# Patient Record
Sex: FEMALE | Race: WHITE | Employment: OTHER | URBAN - METROPOLITAN AREA
[De-identification: names, ages, dates, MRNs, and addresses within clinical notes are randomized per-mention and may not be internally consistent; named-entity substitution may affect disease eponyms.]

---

## 2018-04-03 ENCOUNTER — HOSPITAL ENCOUNTER (OUTPATIENT)
Dept: OTHER | Age: 68
Discharge: HOME | End: 2018-04-03
Attending: INTERNAL MEDICINE | Admitting: INTERNAL MEDICINE

## 2018-04-03 LAB
ANION GAP SERPL CALCULATED.3IONS-SCNC: 10 MMOL/L (ref 3–16)
BASOPHILS ABSOLUTE: 0.1 K/UL (ref 0–0.2)
BASOPHILS RELATIVE PERCENT: 3.5 %
BUN BLDV-MCNC: 24 MG/DL (ref 7–20)
CALCIUM SERPL-MCNC: 8.7 MG/DL (ref 8.3–10.6)
CHLORIDE BLD-SCNC: 106 MMOL/L (ref 99–110)
CHOLESTEROL, TOTAL: 171 MG/DL (ref 0–199)
CO2: 25 MMOL/L (ref 21–32)
CREAT SERPL-MCNC: 0.7 MG/DL (ref 0.6–1.2)
EOSINOPHILS ABSOLUTE: 0.2 K/UL (ref 0–0.6)
EOSINOPHILS RELATIVE PERCENT: 3.9 %
GFR AFRICAN AMERICAN: >60
GFR NON-AFRICAN AMERICAN: >60
GLUCOSE BLD-MCNC: 117 MG/DL (ref 70–99)
HCT VFR BLD CALC: 36 % (ref 36–48)
HDLC SERPL-MCNC: 68 MG/DL (ref 40–60)
HEMOGLOBIN: 12.2 G/DL (ref 12–16)
LDL CHOLESTEROL CALCULATED: 87 MG/DL
LYMPHOCYTES ABSOLUTE: 1.1 K/UL (ref 1–5.1)
LYMPHOCYTES RELATIVE PERCENT: 25.9 %
MCH RBC QN AUTO: 33.1 PG (ref 26–34)
MCHC RBC AUTO-ENTMCNC: 34 G/DL (ref 31–36)
MCV RBC AUTO: 97.2 FL (ref 80–100)
MONOCYTES ABSOLUTE: 0.4 K/UL (ref 0–1.3)
MONOCYTES RELATIVE PERCENT: 8.3 %
NEUTROPHILS ABSOLUTE: 2.5 K/UL (ref 1.7–7.7)
NEUTROPHILS RELATIVE PERCENT: 58.4 %
PDW BLD-RTO: 13.5 % (ref 12.4–15.4)
PLATELET # BLD: 294 K/UL (ref 135–450)
PMV BLD AUTO: 9.4 FL (ref 5–10.5)
POTASSIUM SERPL-SCNC: 4.8 MMOL/L (ref 3.5–5.1)
RBC # BLD: 3.7 M/UL (ref 4–5.2)
SODIUM BLD-SCNC: 141 MMOL/L (ref 136–145)
TRIGL SERPL-MCNC: 82 MG/DL (ref 0–150)
VLDLC SERPL CALC-MCNC: 16 MG/DL
WBC # BLD: 4.2 K/UL (ref 4–11)

## 2019-06-12 RX ORDER — MELOXICAM 7.5 MG/1
7.5 TABLET ORAL DAILY
COMMUNITY

## 2019-06-12 RX ORDER — ALENDRONATE SODIUM 70 MG/1
70 TABLET ORAL
COMMUNITY

## 2019-06-12 RX ORDER — ROSUVASTATIN CALCIUM 5 MG/1
5 TABLET, COATED ORAL DAILY
COMMUNITY

## 2019-06-12 NOTE — PROGRESS NOTES
Narcisa Siemens    Age 76 y.o.    female    1950    MRN [id number]    Date___________   Arrival Time_____________  OR Time____________Duration____     Procedure(s):  EXCISION SUBUNGUAL OSTEOCHONDROMA RIGHT GREAT TOE    Surgeon  ________________________________  [facility]   General   [place]       Phone [phone] (home) [phone] (work)      [address]  Cell Work  ______________________________________________________________________________________________________________________________________________________________________________________________________________________________________________________________________________________________________________________________________________________________    PCP__________________________Phone__________________________________      H&P__________________Bringing      Chart              Epic    DOS           Called_______  EKG__________________Bringing      Chart              Epic    DOS           Called_______  LAB__________________ Bringing      Chart              Epic    DOS           Called_______  CardiacClearance _______Bringing      Chart              Epic    DOS           Called_______      Cardiologist________________________ Phone___________________________      ? Zoroastrian concerns / Waiver on Chart            [employer]________________  ? Pre-op Instructions Given [place]          _________________________________  ? Directions to Surgery Center                          _________________________________  ? Transportation Home_______________      _________________________________  ?  Crutches/Walker__________________        _________________________________      ________Pre-op Orders   _______Transcribed    _______Wt.  ________Pharmacy          _______SCD  ______VTE     ______Beta Blocker  ________Consent

## 2019-06-12 NOTE — PROGRESS NOTES
Obstructive Sleep Apnea (GUSTAVO) Screening     Patient:  Wilfredo Soto    YOB: 1950      Medical Record #:  [id number]                     Date:  6/12/2019     1. Are you a loud and/or regular snorer? []  Yes       [x] No    2. Have you been observed to gasp or stop breathing during sleep? []  Yes       [x] No    3. Do you feel tired or groggy upon awakening or do you awaken with a headache?           []  Yes       [] No    4. Are you often tired or fatigued during the wake time hours? []  Yes       [] No    5. Do you fall asleep sitting, reading, watching TV or driving? []  Yes       [] No    6. Do you often have problems with memory or concentration? []  Yes       [] No    **If patient's score is ? 3 they are considered high risk for GUSTAVO. Notify the anesthesiologist of the high risk and document in focus note. Note:  If the patient's BMI is more than 35 kg m¯² , has neck circumference > 40 cm, and/or high blood pressure the risk is greater (© American Sleep Apnea Association, 2006).

## 2019-06-17 ENCOUNTER — ANESTHESIA EVENT (OUTPATIENT)
Dept: OPERATING ROOM | Age: 69
End: 2019-06-17
Payer: MEDICARE

## 2019-06-17 NOTE — ANESTHESIA PRE PROCEDURE
History:     Smoking status: Never Smoker    Smokeless tobacco: Never Used   Substance Use Topics    Alcohol use: Yes     Alcohol/week: 1.2 oz     Types: 2 Glasses of wine per week     Vital Signs (Current):    BP: 129/86 Pulse: 82   Resp: 20 SpO2: 100   Temp: 97.4 °F (36.3 °C)   Height: 5' 6\" (1.676 m)  (06/12/19) Weight: 172 lb (78 kg)  (06/12/19)   BMI: 27.8           BP Readings from Last 3 Encounters:   10/14/16 142/88   05/23/16 120/68   07/14/14 146/90     NPO Status: > 8 hrs    CBC:    HGB 12.2 04/03/2018    HCT 36.0 04/03/2018     04/03/2018     CMP:     04/03/2018    K 4.8 04/03/2018     04/03/2018    CO2 25 04/03/2018    BUN 24 04/03/2018    CREATININE 0.7 04/03/2018    GLUCOSE 117 04/03/2018    CALCIUM 8.7 04/03/2018     Anesthesia Evaluation  Patient summary reviewed and Nursing notes reviewed  Airway: Mallampati: II  TM distance: >3 FB   Neck ROM: full  Mouth opening: > = 3 FB Dental:          Pulmonary:       (-) COPD, asthma, sleep apnea and not a current smoker                           Cardiovascular:  Exercise tolerance: good (>4 METS),       (-) hypertension, past MI,  angina and  METCALF                Neuro/Psych:      (-) seizures, TIA and CVA           GI/Hepatic/Renal:   (+) hiatal hernia, morbid obesity     (-) GERD and no renal disease       Endo/Other:    (+) hypothyroidism::., .    (-) diabetes mellitus               Abdominal:           Vascular:     - DVT and PE. Anesthesia Plan      MAC and TIVA     ASA 2       Induction: intravenous. MIPS: Prophylactic antiemetics administered. Anesthetic plan and risks discussed with patient. Plan discussed with CRNA.             Karla Mccord MD

## 2019-06-18 ENCOUNTER — HOSPITAL ENCOUNTER (OUTPATIENT)
Age: 69
Setting detail: OUTPATIENT SURGERY
Discharge: HOME | End: 2019-06-18
Attending: PODIATRIST | Admitting: PODIATRIST
Payer: MEDICARE

## 2019-06-18 ENCOUNTER — ANESTHESIA (OUTPATIENT)
Dept: OPERATING ROOM | Age: 69
End: 2019-06-18
Payer: MEDICARE

## 2019-06-18 VITALS
HEART RATE: 82 BPM | HEIGHT: 66 IN | SYSTOLIC BLOOD PRESSURE: 129 MMHG | RESPIRATION RATE: 20 BRPM | WEIGHT: 172 LBS | DIASTOLIC BLOOD PRESSURE: 86 MMHG | BODY MASS INDEX: 27.64 KG/M2 | TEMPERATURE: 97.4 F | OXYGEN SATURATION: 100 %

## 2019-06-18 VITALS — TEMPERATURE: 96.6 F | SYSTOLIC BLOOD PRESSURE: 92 MMHG | OXYGEN SATURATION: 93 % | DIASTOLIC BLOOD PRESSURE: 60 MMHG

## 2019-06-18 DIAGNOSIS — D16.31 OSTEOCHONDROMA OF TOE OF RIGHT FOOT: ICD-10-CM

## 2019-06-18 PROCEDURE — 2580000003 HC RX 258: Performed by: ANESTHESIOLOGY

## 2019-06-18 PROCEDURE — 88311 DECALCIFY TISSUE: CPT

## 2019-06-18 PROCEDURE — 7100000011 HC PHASE II RECOVERY - ADDTL 15 MIN: Performed by: PODIATRIST

## 2019-06-18 PROCEDURE — 2500000003 HC RX 250 WO HCPCS: Performed by: PODIATRIST

## 2019-06-18 PROCEDURE — 6360000002 HC RX W HCPCS: Performed by: NURSE ANESTHETIST, CERTIFIED REGISTERED

## 2019-06-18 PROCEDURE — 3700000001 HC ADD 15 MINUTES (ANESTHESIA): Performed by: PODIATRIST

## 2019-06-18 PROCEDURE — 7100000000 HC PACU RECOVERY - FIRST 15 MIN: Performed by: PODIATRIST

## 2019-06-18 PROCEDURE — 6360000002 HC RX W HCPCS: Performed by: PODIATRIST

## 2019-06-18 PROCEDURE — 88304 TISSUE EXAM BY PATHOLOGIST: CPT

## 2019-06-18 PROCEDURE — 2709999900 HC NON-CHARGEABLE SUPPLY: Performed by: PODIATRIST

## 2019-06-18 PROCEDURE — 3700000000 HC ANESTHESIA ATTENDED CARE: Performed by: PODIATRIST

## 2019-06-18 PROCEDURE — 3600000014 HC SURGERY LEVEL 4 ADDTL 15MIN: Performed by: PODIATRIST

## 2019-06-18 PROCEDURE — 6370000000 HC RX 637 (ALT 250 FOR IP): Performed by: ANESTHESIOLOGY

## 2019-06-18 PROCEDURE — 3600000004 HC SURGERY LEVEL 4 BASE: Performed by: PODIATRIST

## 2019-06-18 PROCEDURE — 2580000003 HC RX 258: Performed by: PODIATRIST

## 2019-06-18 PROCEDURE — 7100000010 HC PHASE II RECOVERY - FIRST 15 MIN: Performed by: PODIATRIST

## 2019-06-18 RX ORDER — PROPOFOL 10 MG/ML
INJECTION, EMULSION INTRAVENOUS PRN
Status: DISCONTINUED | OUTPATIENT
Start: 2019-06-18 | End: 2019-06-18 | Stop reason: SDUPTHER

## 2019-06-18 RX ORDER — MIDAZOLAM HYDROCHLORIDE 1 MG/ML
INJECTION INTRAMUSCULAR; INTRAVENOUS PRN
Status: DISCONTINUED | OUTPATIENT
Start: 2019-06-18 | End: 2019-06-18 | Stop reason: SDUPTHER

## 2019-06-18 RX ORDER — CYCLOSPORINE 0.5 MG/ML
1 EMULSION OPHTHALMIC 2 TIMES DAILY
COMMUNITY

## 2019-06-18 RX ORDER — OXYCODONE HYDROCHLORIDE AND ACETAMINOPHEN 5; 325 MG/1; MG/1
1 TABLET ORAL ONCE
Status: COMPLETED | OUTPATIENT
Start: 2019-06-18 | End: 2019-06-18

## 2019-06-18 RX ORDER — FENTANYL CITRATE 50 UG/ML
INJECTION, SOLUTION INTRAMUSCULAR; INTRAVENOUS PRN
Status: DISCONTINUED | OUTPATIENT
Start: 2019-06-18 | End: 2019-06-18 | Stop reason: SDUPTHER

## 2019-06-18 RX ORDER — ONDANSETRON 2 MG/ML
INJECTION INTRAMUSCULAR; INTRAVENOUS PRN
Status: DISCONTINUED | OUTPATIENT
Start: 2019-06-18 | End: 2019-06-18 | Stop reason: SDUPTHER

## 2019-06-18 RX ORDER — SODIUM CHLORIDE 0.9 % (FLUSH) 0.9 %
10 SYRINGE (ML) INJECTION EVERY 12 HOURS SCHEDULED
Status: DISCONTINUED | OUTPATIENT
Start: 2019-06-18 | End: 2019-06-18 | Stop reason: HOSPADM

## 2019-06-18 RX ORDER — LIDOCAINE HYDROCHLORIDE 10 MG/ML
1 INJECTION, SOLUTION EPIDURAL; INFILTRATION; INTRACAUDAL; PERINEURAL
Status: DISCONTINUED | OUTPATIENT
Start: 2019-06-18 | End: 2019-06-18 | Stop reason: HOSPADM

## 2019-06-18 RX ORDER — OXYCODONE HYDROCHLORIDE AND ACETAMINOPHEN 5; 325 MG/1; MG/1
TABLET ORAL
Status: DISCONTINUED
Start: 2019-06-18 | End: 2019-06-18 | Stop reason: HOSPADM

## 2019-06-18 RX ORDER — SODIUM CHLORIDE 0.9 % (FLUSH) 0.9 %
10 SYRINGE (ML) INJECTION PRN
Status: DISCONTINUED | OUTPATIENT
Start: 2019-06-18 | End: 2019-06-18 | Stop reason: HOSPADM

## 2019-06-18 RX ORDER — SODIUM CHLORIDE, SODIUM LACTATE, POTASSIUM CHLORIDE, CALCIUM CHLORIDE 600; 310; 30; 20 MG/100ML; MG/100ML; MG/100ML; MG/100ML
INJECTION, SOLUTION INTRAVENOUS CONTINUOUS
Status: DISCONTINUED | OUTPATIENT
Start: 2019-06-18 | End: 2019-06-18 | Stop reason: HOSPADM

## 2019-06-18 RX ORDER — LEVOTHYROXINE SODIUM 0.1 MG/1
100 TABLET ORAL DAILY
COMMUNITY

## 2019-06-18 RX ORDER — LIDOCAINE HYDROCHLORIDE 10 MG/ML
INJECTION, SOLUTION EPIDURAL; INFILTRATION; INTRACAUDAL; PERINEURAL PRN
Status: DISCONTINUED | OUTPATIENT
Start: 2019-06-18 | End: 2019-06-18 | Stop reason: ALTCHOICE

## 2019-06-18 RX ORDER — MAGNESIUM HYDROXIDE 1200 MG/15ML
LIQUID ORAL CONTINUOUS PRN
Status: COMPLETED | OUTPATIENT
Start: 2019-06-18 | End: 2019-06-18

## 2019-06-18 RX ADMIN — PROPOFOL 20 MG: 10 INJECTION, EMULSION INTRAVENOUS at 07:34

## 2019-06-18 RX ADMIN — SODIUM CHLORIDE, POTASSIUM CHLORIDE, SODIUM LACTATE AND CALCIUM CHLORIDE: 600; 310; 30; 20 INJECTION, SOLUTION INTRAVENOUS at 06:40

## 2019-06-18 RX ADMIN — PROPOFOL 20 MG: 10 INJECTION, EMULSION INTRAVENOUS at 07:32

## 2019-06-18 RX ADMIN — PROPOFOL 80 MG: 10 INJECTION, EMULSION INTRAVENOUS at 07:23

## 2019-06-18 RX ADMIN — PROPOFOL 30 MG: 10 INJECTION, EMULSION INTRAVENOUS at 07:29

## 2019-06-18 RX ADMIN — MIDAZOLAM HYDROCHLORIDE 2 MG: 2 INJECTION, SOLUTION INTRAMUSCULAR; INTRAVENOUS at 07:15

## 2019-06-18 RX ADMIN — ONDANSETRON 4 MG: 2 INJECTION INTRAMUSCULAR; INTRAVENOUS at 07:28

## 2019-06-18 RX ADMIN — FENTANYL CITRATE 25 MCG: 50 INJECTION INTRAMUSCULAR; INTRAVENOUS at 07:19

## 2019-06-18 RX ADMIN — Medication 2 G: at 07:23

## 2019-06-18 RX ADMIN — OXYCODONE HYDROCHLORIDE AND ACETAMINOPHEN 1 TABLET: 5; 325 TABLET ORAL at 08:13

## 2019-06-18 RX ADMIN — PROPOFOL 40 MG: 10 INJECTION, EMULSION INTRAVENOUS at 07:26

## 2019-06-18 RX ADMIN — FENTANYL CITRATE 25 MCG: 50 INJECTION INTRAMUSCULAR; INTRAVENOUS at 07:34

## 2019-06-18 RX ADMIN — PROPOFOL 30 MG: 10 INJECTION, EMULSION INTRAVENOUS at 07:36

## 2019-06-18 RX ADMIN — SODIUM CHLORIDE, POTASSIUM CHLORIDE, SODIUM LACTATE AND CALCIUM CHLORIDE: 600; 310; 30; 20 INJECTION, SOLUTION INTRAVENOUS at 07:15

## 2019-06-18 ASSESSMENT — PULMONARY FUNCTION TESTS
PIF_VALUE: 0
PIF_VALUE: 1
PIF_VALUE: 0
PIF_VALUE: 1
PIF_VALUE: 0
PIF_VALUE: 1
PIF_VALUE: 0

## 2019-06-18 ASSESSMENT — PAIN SCALES - GENERAL
PAINLEVEL_OUTOF10: 3
PAINLEVEL_OUTOF10: 0
PAINLEVEL_OUTOF10: 4

## 2019-06-18 ASSESSMENT — PAIN DESCRIPTION - LOCATION: LOCATION: FOOT

## 2019-06-18 ASSESSMENT — PAIN DESCRIPTION - ORIENTATION: ORIENTATION: RIGHT

## 2019-06-18 ASSESSMENT — PAIN - FUNCTIONAL ASSESSMENT: PAIN_FUNCTIONAL_ASSESSMENT: 0-10

## 2019-06-18 ASSESSMENT — PAIN DESCRIPTION - PAIN TYPE: TYPE: SURGICAL PAIN

## 2019-06-18 ASSESSMENT — LIFESTYLE VARIABLES: SMOKING_STATUS: 0

## 2019-06-18 NOTE — ANESTHESIA POSTPROCEDURE EVALUATION
Department of Anesthesiology  Postprocedure Note    Patient: Beata Charles  MRN: [id number]  YOB: 1950  Date of evaluation: 6/18/2019    Procedure Summary     Date:  06/18/19 Room / Location:  [facility] / [facility]    Anesthesia Start:  0715 Anesthesia Stop:  5161    Procedure:  EXCISION SUBUNGUAL OSTEOCHONDROMA RIGHT GREAT TOE (Right Foot) Diagnosis:       Osteochondroma of toe of right foot      (SUBUNGUAL OSTEOCHONDROMA RIGHT GREAT TOE)    Surgeon: Stephan Jones DPM Responsible Provider:  Mary Conde MD    Anesthesia Type:  MAC, TIVA ASA Status:  2     Anesthesia Type: No value filed. Manuel Phase I: Manuel Score: 10    Manuel Phase II: Manuel Score: 10    Last vitals: Reviewed and per EMR flowsheets.      Anesthesia Post Evaluation   Anesthetic Problems: no   Cardiovascular System Stable: yes  Respiratory Function: Airway Patent yes  ETT no  Ventilator no  Level of consciousness: awake, alert and oriented  Post-op pain: adequate analgesia  Hydration Adequate: yes  Nausea/Vomiting:no  Other Issues:     Ulysses Landaverde MD

## 2019-06-18 NOTE — H&P
H&P Update    Patient's History and Physical from June was reviewed. Patient examined. There has been no change.     Elizabeth Gasca

## 2019-06-18 NOTE — OP NOTE
[facility]                                OPERATIVE REPORT    PATIENT NAME: Yaneli Tran                  :        1950  MED REC NO:   [id number]                          ROOM:  ACCOUNT NO:   [de-identified]                           ADMIT DATE: 2019  PROVIDER:     Nati Barr DPM    DATE OF PROCEDURE:  2019    SURGEON:  Nati Barr DPM    ASSISTANT:  None. PREOPERATIVE DIAGNOSIS:  Osteochondroma, right great toe. POSTOPERATIVE DIAGNOSES:  1.  Osteochondroma, right great toe. 2.  Pending pathology. PATHOLOGY:  Osteochondroma, right distal phalanx of the great toe. HEMOSTASIS:  Pneumatic ankle tourniquet inflated to 250 mmHg per  anesthesia record. ESTIMATED BLOOD LOSS:  Minimal.    MATERIALS:  4-0 Vicryl and 4-0 nylon. INJECTABLES:  None. CONDITION:  Stable. DESCRIPTION OF PROCEDURE IN DETAIL:  The patient was brought to the  operating room and placed in the supine position on the operating table. Upon adequate sedation per anesthesia department, local infiltration of  10 mL of a 50:50 mixture of 1% lidocaine plain and 0.5% Marcaine plain  was performed to the right foot in a [place] block pattern. At this time,  a well-padded pneumatic ankle tourniquet was applied and the right foot,  prepped and draped in the usual sterile fashion. Following the  exsanguination with an Esmarch bandage, a tourniquet was raised at 250  mmHg. Attention was directed to the right great toe, where highly  incurvated nail plate was appreciated. Utilizing a [place] elevator, the  nail plate was atraumatically released from the underlying skin. It  should be noted that an additional 5 mL of 1% lidocaine plain was  utilized to achieve adequate anesthesia.   At this time, the area was  inspected and a distal transverse incision from medial to lateral at the  distal aspect of the right great toe was

## 2019-06-18 NOTE — PROGRESS NOTES
Advancing hob without compaints  Tolerating Drinking and eating : Attempted to implement non pharmacological methods  of pain management-repositioned/ ice in place   No ponv  Pt alert and appropriate  Respirations  Easy/ unlabored/ no Chest pain or SOB   Surgical drsg unchanged from initial assessment  Circulation checks on operative limb unchanged from last entry in pacu

## 2019-06-18 NOTE — BRIEF OP NOTE
Brief Postoperative Note  ______________________________________________________________    Patient: Radhames Garces  YOB: 1950  MRN: [id number]  Date of Procedure: 6/18/2019    Pre-Op Diagnosis: SUBUNGUAL OSTEOCHONDROMA RIGHT GREAT TOE    Post-Op Diagnosis: Same       Procedure(s):  EXCISION SUBUNGUAL OSTEOCHONDROMA RIGHT GREAT TOE    Anesthesia: Anesthesia type not filed in the log.     Surgeon(s):  Angie Armijo DPM    Assistant: none    Estimated Blood Loss (mL): less than 50     Complications: none    Specimens:   ID Type Source Tests Collected by Time Destination   A : OSTEOCHONDROMA RIGHT GREAT TOE Tissue Tissue SURGICAL PATHOLOGY Angie Armijo DPM 6/18/2019 0738        Implants:  * No implants in log *      Drains: * No LDAs found *    Findings: cartilage cap mass dorsal distal phalanx right great toe    Angie Armijo DPM  Date: 6/18/2019  Time: 7:54 AM

## 2019-06-18 NOTE — PROGRESS NOTES
Advanced pt from lying to sitting without adverse event/pt denies complaints of dizziness/ponv/ RESP  WNL/ surgical drsg/circulation checks on operative limb unchanged from my  last pacu assessment entry     Pt states pain is at a tolerable level-3     instructed pt if no void in 8 hours contact physician or go to ER    Discharge instructions reviewed with patient/responsible adult and understanding verbalized. Discharge instructions signed and copies given. Rx  Percocet                   Given to pts responsible adult b surgeon /instructed not to drive/ingest alcohol while taking narcotic medications. Instructed pt/family member Last dose of narcotics given in recovery room was          percocet    @    0813         Am . Medication information  sheet given to pt/family-all questions answered     Please follow narcotic administration as prescribed by your surgeon- any questions call your surgeon. If you have any problems contact your surgeon and  Go to the emergency room.     Operative drsg unchanged from my initial pacu assessment

## 2022-08-15 ENCOUNTER — HOSPITAL ENCOUNTER (OUTPATIENT)
Dept: PHYSICAL THERAPY | Age: 72
Setting detail: THERAPIES SERIES
Discharge: HOME | End: 2022-08-15
Payer: MEDICARE

## 2022-08-15 PROCEDURE — 97110 THERAPEUTIC EXERCISES: CPT | Performed by: PHYSICAL THERAPIST

## 2022-08-15 PROCEDURE — 97161 PT EVAL LOW COMPLEX 20 MIN: CPT | Performed by: PHYSICAL THERAPIST

## 2022-08-15 PROCEDURE — 97112 NEUROMUSCULAR REEDUCATION: CPT | Performed by: PHYSICAL THERAPIST

## 2022-08-15 NOTE — FLOWSHEET NOTE
[facility], [address]  Phone: [phone]  Fax [phone]    Physical Therapy Treatment Note/ Progress Report:           Date:  8/15/2022    Patient Name:  Latasha Case    :  1950  MRN: [id number]  Restrictions/Precautions:    Medical/Treatment Diagnosis Information:  Diagnosis: M54.50 (ICD-10-CM) - Lumbar pain, lumbar spondylolisthesis M43.16  Treatment Diagnosis: M54.50 (ICD-10-CM) - Lumbar pain, lumbar spondylolisthesis M43.16 s/p lumbar fusion 6/3/22 L4-5. Insurance/Certification information:  PT Insurance Information: [employer] /BMN/Needs [employer] auth  Physician Information:   Peter Thayer MD  Has the plan of care been signed (Y/N):        []  Yes  [x]  No     Date of Patient follow up with Physician: 22      Is this a Progress Report:     []  Yes  [x]  No        If Yes:  Date Range for reporting period:  Beginning8/15/22  Ending:     Progress report will be due (10 Rx or 30 days whichever is less):        Recertification will be due (POC Duration  / 90 days whichever is less): 12 weeks from IE on 8/15/22        Visit # 28036 Leslye Escobar Required   In-person 1 ? [x]  Yes COHERE []  No    Telehealth   []  Yes []  No    Total            Functional Scale: FOTO: 58/100 Risk: 53/100    Date assessed:  8/15/22      Therapy Diagnosis/Practice Pattern:4I. Impaired joint mobility, motor function, muscle performance,  and ROM associated with  bony or soft tissue surgical procedures. Number of Comorbidities:  []0     [x]1-2    []3+    Latex Allergy:  [x]NO      []YES  Preferred Language for Healthcare:   [x]English       []other:      Pain level:  1/10    SUBJECTIVE:  See eval    OBJECTIVE: See eval. Provided education regarding positioning/unloading program and centralization techniques.    OBJECTIVE  Test used Initial score Current Score   Pain Summary      Functional questionnaire      Functional Testing            ROM            Strength                Observation:   Test measurements:      RESTRICTIONS/PRECAUTIONS: No lifting > 10 visits    Exercises/Interventions: 10 weeks post-op on 8/12/22 ( 6/3/22)    Therapeutic Ex (18313) Sets/sec Reps Notes/CUES   See below for HEP instruction. Reviewed and performed all exercises  10'                                                                      Manual Intervention (56385)      Manual stretches to H/GERALDO quads and piriformis 5'                                   NMR re-education (62085)   CUES NEEDED         Patient education regarding posture /body mechanics/HEP 10'                                               Therapeutic Activity (62817)                                          HEP instruction: (see scanned forms)  Access Code: [email or web address]  URL: [email or web address]  Date: 08/15/2022  Prepared by: Cleotis Eliot     Exercises  Cat-Camel to Child's Pose - 1-2 x daily - 7 x weekly - 1-2 sets - 10 reps - 10 hold  Supine Lower Trunk Rotation - 1-2 x daily - 7 x weekly - 1-2 sets - 10 reps - 10 hold  Supine Hip Adduction Isometric with Ball - 1-2 x daily - 7 x weekly - 2 sets - 10 reps - 10 hold  Hooklying Clamshell with Resistance - 1-2 x daily - 7 x weekly - 2 sets - 10 reps - 10 hold  Supine Transversus Abdominis Bracing - Hands on Stomach - 3 x daily - 7 x weekly - 2 sets - 10 reps - 10 hold  Bridge with Hip Abduction and Resistance - Ground Touches - 1-2 x daily - 7 x weekly - 2 sets - 10 reps - 5-10 hold       Therapeutic Exercise and NMR EXR  [x] (53719) Provided verbal/tactile cueing for activities related to strengthening, flexibility, endurance, ROM  for improvements in proximal hip and core control with self care, mobility, lifting and ambulation.  [] (61547) Provided verbal/tactile cueing for activities related to improving balance, coordination, kinesthetic sense, posture, motor skill, proprioception  to assist with core control in self care, mobility, lifting, and ambulation. Therapeutic Activities:    [] (86921 or 50712) Provided verbal/tactile cueing for activities related to improving balance, coordination, kinesthetic sense, posture, motor skill, proprioception and motor activation to allow for proper function  with self care and ADLs  [] (75404) Provided training and instruction to the patient for proper core and proximal hip recruitment and positioning with ambulation re-education     Home Exercise Program:    [x] (71816) Reviewed/Progressed HEP activities related to strengthening, flexibility, endurance, ROM of core, proximal hip and LE for functional self-care, mobility, lifting and ambulation   [] (77724) Reviewed/Progressed HEP activities related to improving balance, coordination, kinesthetic sense, posture, motor skill, proprioception of core, proximal hip and LE for self care, mobility, lifting, and ambulation      Manual Treatments:  PROM / STM / Oscillations-Mobs:  G-I, II, III, IV (PA's, Inf., Post.)  [x] (14366) Provided manual therapy to mobilize proximal hip and LS spine soft tissue/joints for the purpose of modulating pain, promoting relaxation,  increasing ROM, reducing/eliminating soft tissue swelling/inflammation/restriction, improving soft tissue extensibility and allowing for proper ROM for normal function with self care, mobility, lifting and ambulation.      Modalities:   Deferred today    Charges  Timed Code Treatment Minutes: 30'   Total Treatment Minutes: 48'     Medicare total (add KX at $2000)             [x] EVAL (LOW) 26800   [] EVAL (MOD) 34405   [] EVAL (HIGH) 59699   [] RE-EVAL     [x] SD(62288) x 1    [] IONTO  [x] NMR (21052) x 1    [] VASO  [] Manual (36520) x      [] Other:  [] TA x      [] Mech Traction (31084)  [] ES(attended) (55308)      [] ES (un) (83284):     Goals:   Patient stated goal: walking miles  [] Progressing: [] Met: [] Not Met: [] Adjusted     Therapist goals for Patient:  Short Term Goals: To be achieved in: 2 weeks  1. Independent in HEP and progression per patient tolerance, in order to prevent re-injury. [] Progressing: [] Met: [] Not Met: [] Adjusted  2. Patient will have a decrease in pain to facilitate improvement in movement, function, and ADLs as indicated by Functional Deficits. [] Progressing: [] Met: [] Not Met: [] Adjusted        Long Term Goals: To be achieved in: 12 weeks  1. Disability index score of 65% or more per FOTO to assist with reaching prior level of function. [] Progressing: [] Met: [] Not Met: [] Adjusted  2. Patient will demonstrate increased AROM to WNL, good LS mobility, good hip ROM to allow for proper joint functioning as indicated by patients Functional Deficits. [] Progressing: [] Met: [] Not Met: [] Adjusted  3. Patient will demonstrate an increase in Strength to good proximal hip and core activation to allow for proper functional mobility as indicated by patients Functional Deficits. [] Progressing: [] Met: [] Not Met: [] Adjusted  4. Patient will return to performing household activities functional activities without increased symptoms or restriction. [] Progressing: [] Met: [] Not Met: [] Adjusted  5. Patient to be able to walk 2-3 miles without difficulty or pain/stiffness.(patient specific functional goal)    [] Progressing: [] Met: [] Not Met: [] Adjusted       Progression Towards Functional goals:  [] Patient is progressing as expected towards functional goals listed. [] Progression is slowed due to complexities listed. [] Progression has been slowed due to co-morbidities. [x] Plan just implemented, too soon to assess goals progression  [] Other:     Overall Progression Towards Functional goals/ Treatment Progress Update:  [] Patient is progressing as expected towards functional goals listed. [] Progression is slowed due to complexities/Impairments listed. [] Progression has been slowed due to co-morbidities.   [x] Plan just implemented, too soon to assess goals progression <30days   [] Goals require adjustment due to lack of progress  [] Patient is not progressing as expected and requires additional follow up with physician  [] Other    Prognosis for POC: [x] Good [] Fair  [] Poor      Patient requires continued skilled intervention: [x] Yes  [] No    Treatment/Activity Tolerance:  [x] Patient able to complete treatment  [] Patient limited by fatigue  [] Patient limited by pain    [] Patient limited by other medical complications  [] Other:     ASSESSMENT: Patient s/p lumbar fusion on 6/3/22 L4-5. Presents with limited ROM/strength as expected following post-op surgical procedures. Pt requires skilled intervention to restore ROM, strength, functional endurance and balance in order to perform ADLs without significant symptoms or limitations. PReturn to Play: (if applicable)   []  Stage 1: Intro to Strength   []  Stage 2: Return to Run and Strength   []  Stage 3: Return to Jump and Strength   []  Stage 4: Dynamic Strength and Agility   []  Stage 5: Sport Specific Training     []  Ready to Return to Play, Meets All Above Stages   []  Not Ready for Return to Sports   Comments:                               PLAN: See eval. Progress lumbar stabilization and core strengthening as tolerated. [] Continue per plan of care [] Alter current plan (see comments above)  [x] Plan of care initiated [] Hold pending MD visit [] Discharge      Electronically signed by:  Harmeet Vo, [address] [place], [place] [address]T-    Note: If patient does not return for scheduled/ recommended follow up visits, this note will serve as a discharge from care along with most recent update on progress.

## 2022-08-15 NOTE — PLAN OF CARE
[facility], [address]  Phone: [phone]  Fax [phone]    Physical Therapy Certification    Dear Ze Warren MD ,    We had the pleasure of evaluating the following patient for physical therapy services at [address] [place] [address]. A summary of our findings can be found in the initial assessment below. This includes our plan of care. If you have any questions or concerns regarding these findings, please do not hesitate to contact me at the office phone number checked above. Thank you for the referral.       Physician Signature:_______________________________Date:__________________  By signing above (or electronic signature), therapists plan is approved by physician    Patient: Bing Finney   : 1950   MRN: [id number]  Referring Physician:  Ze Warren MD      Evaluation Date: 8/15/2022      Medical Diagnosis Information:  Diagnosis: M54.50 (ICD-10-CM) - Lumbar pain, lumbar spondylolisthesis M43.16   Treatment Diagnosis: M54.50 (ICD-10-CM) - Lumbar pain, lumbar spondylolisthesis M43.16  s/p lumbar fusion L4-5 on 6/3/22. Insurance information: PT Insurance Information: [employer] MC/BMN/Needs Cohere auth       Precautions/ Contra-indications: No lifting > 10 lbs. C-SSRS Triggered by Intake questionnaire (Past 2 wk assessment):   [x] No, Questionnaire did not trigger screening.   [] Yes, Patient intake triggered further evaluation      [] C-SSRS Screening completed  [] PCP notified via Plan of Care  [] Emergency services notified     Latex Allergy:  [x]NO      []YES  Preferred Language for Healthcare:   [x]English       []other:    SUBJECTIVE: Patient stated complaint: Patient reports history of low back since 2021 due to lifting activities. Flare up in January and got an DORIS for disc herniation.  Patient s/p lumbar fusion  L4-5 6/3/22 due to spondylolisthesis. Wore a brace for 1 month. Currently walking 1 mile on treadmill every other day. Main issue is feeling tight and weak. Was able to vacuum the other day without pain during or after. Relevant Medical History:CRP, blood disorder./anemic. Osteopenia. Hyperlipidemia, hypothyroid  Functional Disability Index/G-Codes: FOTO: 58/100  Risk adjusted: 53/100    Height: 5' 6\" Weight: 172  Pain Scale: 0-1/10  Easing factors: walking  Provocative factors:  activity, prolonged sitting. Type: []Constant   [x]Intermittent  []Radiating []Localized []other:     Numbness/Tingling: Denies N/T    Occupation/School: retired RN    Living Status/Prior Level of Function: Independent with ADLs and IADLs, Lives with  . Goes to [place] Oct- May. Yoga for exercises. [employer]le[employer]. OBJECTIVE:     ROM     LUMBAR FLEX 65 No pain hands to above ankles   LUMBAR EXT 10     Sidebend [facility] WFL   Rotation      LEFT RIGHT   HIP Flex [facility]   HIP Abd     HIP ER [facility]   HIP IR     Knee Flex [facility] WFL   Knee ext     Hamstring FLEX WFL WFL   Piriformis  WFL WFL             Strength  LEFT RIGHT   MfA     TrA 3  Initiates but unable to hold   HIP Flexors 4+ 4   HIP Abductors 4 4-   HIP ER 4- 4-   Hip IR 4- 4-   Knee EXT (quad) 4+ 4+   Knee Flex (HS) 4+ 4+   Ankle DF 4 4   Ankle PF 4 4   Great Toe Ext 4 4     Reflexes/Sensation:    [x]Dermatomes/Myotomes intact    []UE Reflexes     []Normal []Hypo      []Hyper   []LE Reflexes     [x]Normal []Hypo      []Hyper   []Babinski/Clonus/Hoffmans:    []Other:    Joint mobility: NT due to post-op   []Normal    []Hypo   []Hyper    Palpation: - tenderness with palpation    Functional Mobility/Transfers: IND    Posture: WFL, sym iliac crest    Bandages/Dressings/Incisions: Intact and healing well    Gait: (include devices/WB status) WFL. Good stride length with slight arm swing noted    Orthopedic Special Tests:  slight weakness/control noted with SLS R/L  but able to perform. [x] Patient history, allergies, meds reviewed. Medical chart reviewed. See intake form. Review Of Systems (ROS):  [x]Performed Review of systems (Integumentary, CardioPulmonary, Neurological) by intake and observation. Intake form has been scanned into medical record. Patient has been instructed to contact their primary care physician regarding ROS issues if not already being addressed at this time. Co-morbidities/Complexities (which will affect course of rehabilitation):   []None           Arthritic conditions   []Rheumatoid arthritis (M05.9)  []Osteoarthritis (M19.91)   Cardiovascular conditions   [x]Hypertension (I10)  [x]Hyperlipidemia (E78.5)  []Angina pectoris (I20)  []Atherosclerosis (I70)   Musculoskeletal conditions   []Disc pathology   []Congenital spine pathologies   []Prior surgical intervention  []Osteoporosis (M81.8)  [x]Osteopenia (M85.8)   Endocrine conditions   [x]Hypothyroid (E03.9)  []Hyperthyroid Gastrointestinal conditions   []Constipation (Q27.62)   Metabolic conditions   []Morbid obesity (E66.01)  []Diabetes type 1(E10.65) or 2 (E11.65)   []Neuropathy (G60.9)     Pulmonary conditions   []Asthma (J45)  []Coughing   []COPD (J44.9)   Psychological Disorders  []Anxiety (F41.9)  []Depression (F32.9)   []Other:   []Other:          Barriers to/and or personal factors that will affect rehab potential:              []Age  []Sex              []Motivation/Lack of Motivation                        [x]Co-Morbidities              []Cognitive Function, education/learning barriers              []Environmental, home barriers              []profession/work barriers  []past PT/medical experience  []other:  Justification: leaves for [place] in 1 in October. Falls Risk Assessment (30 days):   [x] Falls Risk assessed and no intervention required.   [] Falls Risk assessed and Patient requires intervention due to being higher risk   TUG score (>12s at risk):     [] Falls education provided, including           ASSESSMENT:   Functional Impairments:     []Noted lumbar/proximal hip hypomobility   []Noted lumbosacral and/or generalized hypermobility   [x]Decreased Lumbosacral/hip/LE functional ROM   [x]Decreased core/proximal hip strength and neuromuscular control    [x]Decreased LE functional strength    []Abnormal reflexes/sensation/myotomal/dermatomal deficits  []Reduced balance/proprioceptive control    []other:      Functional Activity Limitations (from functional questionnaire and intake)   [x]Reduced ability to tolerate prolonged functional positions   []Reduced ability or difficulty with changes of positions or transfers between positions   [x]Reduced ability to maintain good posture and demonstrate good body mechanics with sitting, bending, and lifting   []Reduced ability to sleep   [] Reduced ability or tolerance with driving and/or computer work   []Reduced ability to perform lifting, reaching, carrying tasks   []Reduced ability to squat   []Reduced ability to forward bend   [x]Reduced ability to ambulate prolonged functional periods/distances/surfaces   []Reduced ability to ascend/descend stairs   []other:       Participation Restrictions   []Reduced participation in self care activities   [x]Reduced participation in home management activities   []Reduced participation in work activities   [x]Reduced participation in social activities. []Reduced participation in sport/recreation activities. Classification:   [x]Signs/symptoms consistent with Lumbar instability/stabilization subgroup. []Signs/symptoms consistent with Lumbar mobilization/manipulation subgroup, myotomes and dermatomes intact. Meets manipulation criteria.     []Signs/symptoms consistent with Lumbar direction specific/centralization subgroup   []Signs/symptoms consistent with Lumbar traction subgroup     []Signs/symptoms consistent with lumbar facet dysfunction   []Signs/symptoms consistent with lumbar stenosis type dysfunction   []Signs/symptoms consistent with nerve root involvement including myotome & dermatome dysfunction   [x]Signs/symptoms consistent with post-surgical status including: decreased ROM, strength and function. []signs/symptoms consistent with pathology which may benefit from Dry needling     []other:      Prognosis/Rehab Potential:      []Excellent   []Good    []Fair   []Poor    Tolerance of evaluation/treatment:    []Excellent   [x]Good    []Fair   []Poor  Physical Therapy Evaluation Complexity Justification  [x] A history of present problem with:  [] no personal factors and/or comorbidities that impact the plan of care;  [x]1-2 personal factors and/or comorbidities that impact the plan of care  []3 personal factors and/or comorbidities that impact the plan of care  [x] An examination of body systems using standardized tests and measures addressing any of the following: body structures and functions (impairments), activity limitations, and/or participation restrictions;:  [] a total of 1-2 or more elements   [x] a total of 3 or more elements   [] a total of 4 or more elements   [x] A clinical presentation with:  [x] stable and/or uncomplicated characteristics   [] evolving clinical presentation with changing characteristics  [] unstable and unpredictable characteristics;   [x] Clinical decision making of [x] low, [] moderate, [] high complexity using standardized patient assessment instrument and/or measurable assessment of functional outcome.     [x] EVAL (LOW) 55982 (typically 20 minutes face-to-face)  [] EVAL (MOD) 22294 (typically 30 minutes face-to-face)  [] EVAL (HIGH) 39635 (typically 45 minutes face-to-face)  [] RE-EVAL         PLAN: Begin PT focusing on: proximal hip mobilizations, LB mobs, LB core activation, proximal hip activation, and HEP    Frequency/Duration:  1-2 days per week for 12 Weeks:  Interventions:  [x]  Therapeutic exercise including: strength training, ROM, for LE, Glutes and mobility, good hip ROM to allow for proper joint functioning as indicated by patients Functional Deficits. [] Progressing: [] Met: [] Not Met: [] Adjusted  3. Patient will demonstrate an increase in Strength to good proximal hip and core activation to allow for proper functional mobility as indicated by patients Functional Deficits. [] Progressing: [] Met: [] Not Met: [] Adjusted  4. Patient will return to performing household activities functional activities without increased symptoms or restriction. [] Progressing: [] Met: [] Not Met: [] Adjusted  5.  Patient to be able to walk 2-3 miles without difficulty or pain/stiffness.(patient specific functional goal)    [] Progressing: [] Met: [] Not Met: [] Adjusted       Electronically signed by:  Binu Neri, [address] OMT-C

## 2022-08-24 ENCOUNTER — HOSPITAL ENCOUNTER (OUTPATIENT)
Dept: PHYSICAL THERAPY | Age: 72
Setting detail: THERAPIES SERIES
Discharge: HOME | End: 2022-08-24
Payer: MEDICARE

## 2022-08-24 NOTE — FLOWSHEET NOTE
[facility], [address] [place]  Phone: [phone]  Fax [phone]    Physical Therapy Treatment Note/ Progress Report:           Date:  2022    Patient Name:  Abi Bansal    :  1950  MRN: [id number]  Restrictions/Precautions:    Medical/Treatment Diagnosis Information:  Diagnosis: M54.50 (ICD-10-CM) - Lumbar pain, lumbar spondylolisthesis M43.16  Treatment Diagnosis: M54.50 (ICD-10-CM) - Lumbar pain, lumbar spondylolisthesis M43.16 s/p lumbar fusion 6/3/22 L4-5. Insurance/Certification information:  PT Insurance Information: Esther /BMJOSE ALEJANDRO/Needs Cohere auth  Physician Information:   Katya Rivera MD  Has the plan of care been signed (Y/N):        []  Yes  [x]  No     Date of Patient follow up with Physician: 22      Is this a Progress Report:     []  Yes  [x]  No        If Yes:  Date Range for reporting period:  Beginnin/15/22  Ending:     Progress report will be due (10 Rx or 30 days whichever is less):        Recertification will be due (POC Duration  / 90 days whichever is less): 12 weeks from IE on 8/15/22        Visit # Insurance Allowable Auth Required   In-person 2 24 visits 8/15 /22 to 22 [x]  Yes COHERE []  No    Telehealth   []  Yes []  No    Total            Functional Scale: FOTO: 58/100 Risk: 53/100    Date assessed:  8/15/22      Therapy Diagnosis/Practice Pattern:4I. Impaired joint mobility, motor function, muscle performance,  and ROM associated with  bony or soft tissue surgical procedures. Number of Comorbidities:  []0     [x]1-2    []3+    Latex Allergy:  [x]NO      []YES  Preferred Language for Healthcare:   [x]English       []other:      Pain level:  1/10    SUBJECTIVE: Reports overdoing it with the LTR exercise. Reports rocking knee to floor instead of small range and had increased pain in low back for several days.  Still walking every other day for about 30 min on treadmill at 2.5 mph without any issues. OBJECTIVE: See eval. Provided education regarding positioning/unloading program and centralization techniques. OBJECTIVE  Test used 8/15/22 Current Score   Pain Summary 0-10 0-1    Functional questionnaire FOTO 58/100    Functional Testing       Lumbar Flex 65    ROM Lumbar Ext 10     TrA 3/5    Strength Hip Er/Ir 4-     Hip abductors L/R 4, 4-        Observation:   Test measurements:      RESTRICTIONS/PRECAUTIONS: No lifting > 10 visits    Exercises/Interventions: 10 weeks post-op on 8/12/22 ( 6/3/22)    Therapeutic Ex (18409) Sets/sec Reps Notes/CUES   See below for HEP instruction. Reviewed and performed all exercises  10'          Incline stretches H30 5 reps    True stretches HS H5 10 reps                Cat-camel to child pose H5 5 reps                                  Manual Intervention (99439)      Manual stretches to H/GERALDO quads and piriformis           SB piriformis stretches H20 3 reps                      NMR re-education (35331)   CUES NEEDED         Patient education regarding posture /body mechanics/HEP 10'           TrA  with hip add with stabilizer at 40mmhg H10 10 reps          TrA with BKFO H5 2x10 reps          Quad leg ext H5 1X10 reps    Reformer pilates 1R, 1B B LP/ walking in place  X20 reps V.C. for TrA activation   Therapeutic Activity (19261)                                          HEP instruction: (see scanned forms)  Access Code: [email or web address]  URL: [email or web address]  Date: 08/15/2022  Prepared by: Murray Sharp     Exercises  Cat-Camel to Child's Pose - 1-2 x daily - 7 x weekly - 1-2 sets - 10 reps - 10 hold  Supine Lower Trunk Rotation - 1-2 x daily - 7 x weekly - 1-2 sets - 10 reps - 10 hold  Supine Hip Adduction Isometric with Ball - 1-2 x daily - 7 x weekly - 2 sets - 10 reps - 10 hold  Hooklying Clamshell with Resistance - 1-2 x daily - 7 x weekly - 2 sets - 10 reps - 10 hold  Supine Transversus Abdominis Bracing - Hands on Stomach - 3 x daily - 7 x weekly - 2 sets - 10 reps - 10 hold  Bridge with Hip Abduction and Resistance - Ground Touches - 1-2 x daily - 7 x weekly - 2 sets - 10 reps - 5-10 hold       Therapeutic Exercise and NMR EXR  [x] (78024) Provided verbal/tactile cueing for activities related to strengthening, flexibility, endurance, ROM  for improvements in proximal hip and core control with self care, mobility, lifting and ambulation.  [] (83633) Provided verbal/tactile cueing for activities related to improving balance, coordination, kinesthetic sense, posture, motor skill, proprioception  to assist with core control in self care, mobility, lifting, and ambulation.      Therapeutic Activities:    [x] (90137 or 17924) Provided verbal/tactile cueing for activities related to improving balance, coordination, kinesthetic sense, posture, motor skill, proprioception and motor activation to allow for proper function  with self care and ADLs  [] (37442) Provided training and instruction to the patient for proper core and proximal hip recruitment and positioning with ambulation re-education     Home Exercise Program:    [x] (46229) Reviewed/Progressed HEP activities related to strengthening, flexibility, endurance, ROM of core, proximal hip and LE for functional self-care, mobility, lifting and ambulation   [] (82941) Reviewed/Progressed HEP activities related to improving balance, coordination, kinesthetic sense, posture, motor skill, proprioception of core, proximal hip and LE for self care, mobility, lifting, and ambulation      Manual Treatments:  PROM / STM / Oscillations-Mobs:  G-I, II, III, IV (PA's, Inf., Post.)  [x] (05952) Provided manual therapy to mobilize proximal hip and LS spine soft tissue/joints for the purpose of modulating pain, promoting relaxation,  increasing ROM, reducing/eliminating soft tissue swelling/inflammation/restriction, improving soft tissue extensibility and allowing for proper ROM for normal function with self care, mobility, lifting and ambulation. Modalities:   Deferred today    Charges  Timed Code Treatment Minutes: 48'   Total Treatment Minutes: 48'     Medicare total (add KX at $2000)             [] EVAL (LOW) 21639   [] EVAL (MOD) 87167   [] EVAL (HIGH) 31078   [] RE-EVAL     [x] MN(55141) x 2   [] IONTO  [x] NMR (31823) x 2    [] VASO  [] Manual (52902) x      [] Other:  [] TA x      [] Mech Traction (94295)  [] ES(attended) (43216)      [] ES (un) (56062):     Goals:   Patient stated goal: walking miles  [] Progressing: [] Met: [] Not Met: [] Adjusted     Therapist goals for Patient:  Short Term Goals: To be achieved in: 2 weeks  1. Independent in HEP and progression per patient tolerance, in order to prevent re-injury. [] Progressing: [] Met: [] Not Met: [] Adjusted  2. Patient will have a decrease in pain to facilitate improvement in movement, function, and ADLs as indicated by Functional Deficits. [] Progressing: [] Met: [] Not Met: [] Adjusted        Long Term Goals: To be achieved in: 12 weeks  1. Disability index score of 65% or more per FOTO to assist with reaching prior level of function. [] Progressing: [] Met: [] Not Met: [] Adjusted  2. Patient will demonstrate increased AROM to WNL, good LS mobility, good hip ROM to allow for proper joint functioning as indicated by patients Functional Deficits. [] Progressing: [] Met: [] Not Met: [] Adjusted  3. Patient will demonstrate an increase in Strength to good proximal hip and core activation to allow for proper functional mobility as indicated by patients Functional Deficits. [] Progressing: [] Met: [] Not Met: [] Adjusted  4. Patient will return to performing household activities functional activities without increased symptoms or restriction. [] Progressing: [] Met: [] Not Met: [] Adjusted  5.  Patient to be able to walk 2-3 miles without difficulty or pain/stiffness.(patient specific functional goal)    [] Progressing: [] Met: [] Not Met: [] Adjusted       Progression Towards Functional goals:  [] Patient is progressing as expected towards functional goals listed. [] Progression is slowed due to complexities listed. [] Progression has been slowed due to co-morbidities. [x] Plan just implemented, too soon to assess goals progression  [] Other:     Overall Progression Towards Functional goals/ Treatment Progress Update:  [] Patient is progressing as expected towards functional goals listed. [] Progression is slowed due to complexities/Impairments listed. [] Progression has been slowed due to co-morbidities. [x] Plan just implemented, too soon to assess goals progression <30days   [] Goals require adjustment due to lack of progress  [] Patient is not progressing as expected and requires additional follow up with physician  [] Other    Prognosis for POC: [x] Good [] Fair  [] Poor      Patient requires continued skilled intervention: [x] Yes  [] No    Treatment/Activity Tolerance:  [x] Patient able to complete treatment  [] Patient limited by fatigue  [] Patient limited by pain    [] Patient limited by other medical complications  [] Other:     ASSESSMENT: Patient s/p lumbar fusion on 6/3/22 L4-5. Presents with limited ROM/strength as expected following post-op surgical procedures. Pt requires skilled intervention to restore ROM, strength, functional endurance and balance in order to perform ADLs without significant symptoms or limitations. PReturn to Play: (if applicable)   []  Stage 1: Intro to Strength   []  Stage 2: Return to Run and Strength   []  Stage 3: Return to Jump and Strength   []  Stage 4: Dynamic Strength and Agility   []  Stage 5: Sport Specific Training     []  Ready to Return to Play, Meets All Above Stages   []  Not Ready for Return to Sports   Comments:                               PLAN: See eval. Progress lumbar stabilization and core strengthening as tolerated.   [x] Continue per plan of care [] Valerie Odell current plan (see comments above)  [] Plan of care initiated [] Hold pending MD visit [] Discharge      Electronically signed by:  Chaya Youngblood, [facility]    Note: If patient does not return for scheduled/ recommended follow up visits, this note will serve as a discharge from care along with most recent update on progress.

## 2022-08-31 ENCOUNTER — HOSPITAL ENCOUNTER (OUTPATIENT)
Dept: PHYSICAL THERAPY | Age: 72
Setting detail: THERAPIES SERIES
Discharge: HOME | End: 2022-08-31
Payer: MEDICARE

## 2022-08-31 PROCEDURE — 97110 THERAPEUTIC EXERCISES: CPT | Performed by: PHYSICAL THERAPIST

## 2022-08-31 PROCEDURE — 97112 NEUROMUSCULAR REEDUCATION: CPT | Performed by: PHYSICAL THERAPIST

## 2022-08-31 NOTE — FLOWSHEET NOTE
[facility], [address]  Phone: [phone]  Fax [phone]    Physical Therapy Treatment Note/ Progress Report:           Date:  2022    Patient Name:  Oh Lancaster    :  1950  MRN: [id number]  Restrictions/Precautions:    Medical/Treatment Diagnosis Information:  Diagnosis: M54.50 (ICD-10-CM) - Lumbar pain, lumbar spondylolisthesis M43.16  Treatment Diagnosis: M54.50 (ICD-10-CM) - Lumbar pain, lumbar spondylolisthesis M43.16 s/p lumbar fusion 6/3/22 L4-5. Insurance/Certification information:  PT Insurance Information: [employer] /BMN/Needs [employer] auth  Physician Information:   Jean Walker MD  Has the plan of care been signed (Y/N):        []  Yes  [x]  No     Date of Patient follow up with Physician: 22      Is this a Progress Report:     []  Yes  [x]  No        If Yes:  Date Range for reporting period:  Beginnin/15/22  Ending:     Progress report will be due (10 Rx or 30 days whichever is less): 56       Recertification will be due (POC Duration  / 90 days whichever is less): 12 weeks from IE on 8/15/22        Visit # Insurance Allowable Auth Required   In-person 3 24 visits 8/15 /22 to 22 [x]  Yes [facility] []  No    Telehealth   []  Yes []  No    Total            Functional Scale: FOTO: 58/100 Risk: 53/100    Date assessed:  8/15/22      Therapy Diagnosis/Practice Pattern:4I. Impaired joint mobility, motor function, muscle performance,  and ROM associated with  bony or soft tissue surgical procedures. Number of Comorbidities:  []0     [x]1-2    []3+    Latex Allergy:  [x]NO      []YES  Preferred Language for Healthcare:   [x]English       []other:      Pain level:  1/10    SUBJECTIVE: Reports no issues with new exercises. Minimal to no pain noted. Sees Dr. Marbella Fonseca tomorrow. Still walking every other day for about 30 min on treadmill at 2.5 mph without any issues. Seeing 7 x weekly - 2 sets - 10 reps - 10 hold  Hooklying Clamshell with Resistance - 1-2 x daily - 7 x weekly - 2 sets - 10 reps - 10 hold  Supine Transversus Abdominis Bracing - Hands on Stomach - 3 x daily - 7 x weekly - 2 sets - 10 reps - 10 hold  Bridge with Hip Abduction and Resistance - Ground Touches - 1-2 x daily - 7 x weekly - 2 sets - 10 reps - 5-10 hold   Access Code: [email or web address]  URL: [email or web address]  Date: 08/31/2022  Prepared by: Miesha Ruffing    Exercises  Bent Knee Fallouts with Alternating Legs - 2 x daily - 7 x weekly - 2 sets - 10 reps - 5-10 hold  Supine 90/90 Abdominal Bracing - 2 x daily - 7 x weekly - 2 sets - 10 reps - 5-10 hold  Quadruped Alternating Arm Lift - 1 x daily - 7 x weekly - 3 sets - 10 reps - 5 hold  Quadruped Alternating Leg Extensions - 1 x daily - 7 x weekly - 3 sets - 10 reps - 5 hold      Therapeutic Exercise and NMR EXR  [x] (63578) Provided verbal/tactile cueing for activities related to strengthening, flexibility, endurance, ROM  for improvements in proximal hip and core control with self care, mobility, lifting and ambulation.  [] (70135) Provided verbal/tactile cueing for activities related to improving balance, coordination, kinesthetic sense, posture, motor skill, proprioception  to assist with core control in self care, mobility, lifting, and ambulation.      Therapeutic Activities:    [x] (52022 or 22902) Provided verbal/tactile cueing for activities related to improving balance, coordination, kinesthetic sense, posture, motor skill, proprioception and motor activation to allow for proper function  with self care and ADLs  [] (33919) Provided training and instruction to the patient for proper core and proximal hip recruitment and positioning with ambulation re-education     Home Exercise Program:    [x] (61557) Reviewed/Progressed HEP activities related to strengthening, flexibility, endurance, ROM of core, proximal hip and LE for functional self-care, mobility, joint functioning as indicated by patients Functional Deficits. [] Progressing: [] Met: [] Not Met: [] Adjusted  3. Patient will demonstrate an increase in Strength to good proximal hip and core activation to allow for proper functional mobility as indicated by patients Functional Deficits. [] Progressing: [] Met: [] Not Met: [] Adjusted  4. Patient will return to performing household activities functional activities without increased symptoms or restriction. [] Progressing: [] Met: [] Not Met: [] Adjusted  5. Patient to be able to walk 2-3 miles without difficulty or pain/stiffness.(patient specific functional goal)    [] Progressing: [] Met: [] Not Met: [] Adjusted       Progression Towards Functional goals:  [] Patient is progressing as expected towards functional goals listed. [] Progression is slowed due to complexities listed. [] Progression has been slowed due to co-morbidities. [x] Plan just implemented, too soon to assess goals progression  [] Other:     Overall Progression Towards Functional goals/ Treatment Progress Update:  [] Patient is progressing as expected towards functional goals listed. [] Progression is slowed due to complexities/Impairments listed. [] Progression has been slowed due to co-morbidities. [x] Plan just implemented, too soon to assess goals progression <30days   [] Goals require adjustment due to lack of progress  [] Patient is not progressing as expected and requires additional follow up with physician  [] Other    Prognosis for POC: [x] Good [] Fair  [] Poor      Patient requires continued skilled intervention: [x] Yes  [] No    Treatment/Activity Tolerance:  [x] Patient able to complete treatment  [] Patient limited by fatigue  [] Patient limited by pain    [] Patient limited by other medical complications  [] Other:     ASSESSMENT: Patient s/p lumbar fusion on 6/3/22 L4-5. Presents with limited ROM/strength as expected following post-op surgical procedures.  Tolerated new exercises without difficulty. Progressed HEP this visit. Assess response. Pt requires skilled intervention to restore ROM, strength, functional endurance and balance in order to perform ADLs without significant symptoms or limitations. PReturn to Play: (if applicable)   []  Stage 1: Intro to Strength   []  Stage 2: Return to Run and Strength   []  Stage 3: Return to Jump and Strength   []  Stage 4: Dynamic Strength and Agility   []  Stage 5: Sport Specific Training     []  Ready to Return to Play, Meets All Above Stages   []  Not Ready for Return to Sports   Comments:                               PLAN: See eval. Progress lumbar stabilization and core strengthening as tolerated. [x] Continue per plan of care [] Alter current plan (see comments above)  [] Plan of care initiated [] Hold pending MD visit [] Discharge      Electronically signed by:  Miesha Minor, [address] [place] [address]    Note: If patient does not return for scheduled/ recommended follow up visits, this note will serve as a discharge from care along with most recent update on progress.

## 2022-10-12 NOTE — FLOWSHEET NOTE
[facility],  [address]        Physical Therapy  Cancellation/No-show Note  Patient Name:  Jocelynn Olvera  :  1950   Date:  10/12/2022  Cancelled visits to date: 0  No-shows to date: 0    For today's appointment patient:  []  Cancelled  []  Rescheduled appointment  []  No-show     Reason given by patient:  []  Patient ill  []  Conflicting appointment  []  No transportation    []  Conflict with work  []  No reason given  [x]  Other:     Comments:  Patient's daughter reports at this time that her back is doing well and doesn't want to come back at this point due to recent diagnosis with CA and is not at a place right now to continue PT at this time.      Electronically signed by:  Rica Mcburney, PT

## (undated) DEVICE — GLOVE SURG SZ 8 L12IN FNGR THK94MIL STD WHT LTX FREE

## (undated) DEVICE — NEEDLE HYPO 25GA L1.5IN BLU POLYPR HUB S STL REG BVL STR

## (undated) DEVICE — GLOVE,SURG,SENSICARE,ALOE,LF,PF,7: Brand: MEDLINE

## (undated) DEVICE — STANDARD HYPODERMIC NEEDLE,POLYPROPYLENE HUB: Brand: MONOJECT

## (undated) DEVICE — BANDAGE GZ W2XL75IN ST RAYON POLY CNFRM STRTCH LTWT

## (undated) DEVICE — PACK COOL L W14XL6.5IN 3 LAYR CONSTR SFT CLP CLSR 4 TIE

## (undated) DEVICE — SOLUTION IV 1000ML LAC RINGERS PH 6.5 INJ USP VIAFLX PLAS

## (undated) DEVICE — GLOVE SURG SZ 85 L12IN FNGR THK94MIL STD WHT LTX FREE

## (undated) DEVICE — CATHETER IV 20GA L1.25IN PNK FEP SFTY STR HUB RADPQ DISP

## (undated) DEVICE — SUTURE ETHLN SZ 4-0 L18IN NONABSORBABLE BLK L19MM PS-2 3/8 1667H

## (undated) DEVICE — SUTURE VCRL SZ 4-0 L18IN ABSRB UD L19MM PS-2 3/8 CIR PRIM J496H

## (undated) DEVICE — SET GRAV VENT NVENT CK VLV 3 NDL FREE PRT 10 GTT

## (undated) DEVICE — PACK EXTREMITY XR

## (undated) DEVICE — SYRINGE, LUER LOCK, 10ML: Brand: MEDLINE

## (undated) DEVICE — AIRLIFE™ NASAL OXYGEN CANNULA CURVED, FLARED TIP, WITH 7 FEET (2.1 M) CRUSH RESISTANT TUBING, OVER-THE-EAR STYLE: Brand: AIRLIFE™

## (undated) DEVICE — SET ADMIN PRIMING 7ML L30IN 7.35LB 20 GTT 2ND RLER CLMP

## (undated) DEVICE — ZIMMER® STERILE DISPOSABLE TOURNIQUET CUFF WITH PLC, DUAL PORT, SINGLE BLADDER, 18 IN. (46 CM)

## (undated) DEVICE — 3M™ TEGADERM™ TRANSPARENT FILM DRESSING FRAME STYLE, 1624W, 2-3/8 IN X 2-3/4 IN (6 CM X 7 CM), 100/CT 4CT/CASE: Brand: 3M™ TEGADERM™

## (undated) DEVICE — SOLUTION IV IRRIG 500ML 0.9% SODIUM CHL 2F7123